# Patient Record
Sex: FEMALE | URBAN - METROPOLITAN AREA
[De-identification: names, ages, dates, MRNs, and addresses within clinical notes are randomized per-mention and may not be internally consistent; named-entity substitution may affect disease eponyms.]

---

## 2019-09-04 ENCOUNTER — APPOINTMENT (RX ONLY)
Dept: URBAN - METROPOLITAN AREA CLINIC 51 | Facility: CLINIC | Age: 74
Setting detail: DERMATOLOGY
End: 2019-09-04

## 2019-09-04 DIAGNOSIS — Z41.9 ENCOUNTER FOR PROCEDURE FOR PURPOSES OTHER THAN REMEDYING HEALTH STATE, UNSPECIFIED: ICD-10-CM

## 2019-09-04 PROCEDURE — ? JUVEDERM ULTRA XC INJECTION

## 2019-09-04 PROCEDURE — ? JUVEDERM VOLLURE XC INJECTION

## 2019-09-04 PROCEDURE — ? BOTOX

## 2019-09-04 PROCEDURE — ? COSMETIC CONSULTATION: BOTULINUM TOXIN

## 2019-09-04 NOTE — PROCEDURE: JUVEDERM VOLLURE XC INJECTION
Anesthesia Type: 1% lidocaine with epinephrine
Additional Area 5 Volume In Cc: 0
Use Map Statement For Sites (Optional): No
Nasolabial Folds Filler Volume In Cc: 2
Filler: Juvederm Vollure XC
Consent: Written consent obtained. Risks include but not limited to bruising, beading, irregular texture, ulceration, infection, allergic reaction, scar formation, incomplete augmentation, temporary nature, procedural pain.
Lot #: [id number]
Additional Anesthesia Volume In Cc: 6
Procedural Text: The filler was administered to the treatment areas noted above.
Post-Care Instructions: Patient instructed to apply ice to reduce swelling.
Anesthesia Volume In Cc: 0.5
Map Statment: See [address] for Complete Details
Detail Level: Detailed
Expiration Date (Month Year): 2019-10-13

## 2019-09-04 NOTE — PROCEDURE: JUVEDERM ULTRA XC INJECTION
Detail Level: Detailed
Use Map Statement For Sites (Optional): No
Map Statment: See [address] for Complete Details
Anesthesia Type: 1% lidocaine with epinephrine
Anesthesia Volume In Cc: 0.5
Additional Anesthesia Volume In Cc: 6
Filler: Juvederm Ultra XC
Jawline Filler Volume In Cc: 0
Cheeks Filler Volume In Cc: 2
Lot #: [id number]
Expiration Date (Month Year): 2020-02-26
Procedural Text: The filler was administered to the treatment areas noted above.
Consent: Written consent obtained. Risks include but not limited to bruising, beading, irregular texture, ulceration, infection, allergic reaction, scar formation, incomplete augmentation, temporary nature, procedural pain.
Post-Care Instructions: Patient instructed to apply ice to reduce swelling.

## 2019-09-04 NOTE — PROCEDURE: BOTOX
Inferior Lateral Orbicularis Oculi Units: 0
Detail Level: Simple
Dilution (U/0.1 Cc): 3.5
Consent: Written consent obtained. Risks include but not limited to lid/brow ptosis, bruising, swelling, diplopia, temporary effect, incomplete chemical denervation.
Post-Care Instructions: Patient instructed to not lie down for 4 hours and limit physical activity for 24 hours. Patient instructed not to travel by airplane for 48 hours.
Glabellar Complex Units: [address]
Periorbital Skin Units: [address] [address]
Lot #: [id number]
Expiration Date (Month Year): [id number]

## 2019-10-03 ENCOUNTER — APPOINTMENT (RX ONLY)
Dept: URBAN - METROPOLITAN AREA CLINIC 51 | Facility: CLINIC | Age: 74
Setting detail: DERMATOLOGY
End: 2019-10-03

## 2019-10-03 DIAGNOSIS — Z41.9 ENCOUNTER FOR PROCEDURE FOR PURPOSES OTHER THAN REMEDYING HEALTH STATE, UNSPECIFIED: ICD-10-CM

## 2019-10-03 PROCEDURE — ? FILLERS

## 2019-10-03 ASSESSMENT — LOCATION SIMPLE DESCRIPTION DERM
LOCATION SIMPLE: LEFT CHEEK
LOCATION SIMPLE: RIGHT CHEEK

## 2019-10-03 ASSESSMENT — LOCATION DETAILED DESCRIPTION DERM
LOCATION DETAILED: RIGHT INFERIOR MEDIAL MALAR CHEEK
LOCATION DETAILED: LEFT INFERIOR MEDIAL MALAR CHEEK
LOCATION DETAILED: RIGHT SUPERIOR LATERAL MALAR CHEEK
LOCATION DETAILED: LEFT SUPERIOR LATERAL MALAR CHEEK

## 2019-10-03 ASSESSMENT — LOCATION ZONE DERM: LOCATION ZONE: FACE

## 2019-10-03 NOTE — PROCEDURE: FILLERS
Versa Syringe Price (Per 1.0 Cc- Numbers Only No Special Characters Or $): 0.00
Consent: Written consent obtained. Risks include but not limited to bruising, beading, irregular texture, ulceration, infection, allergic reaction, scar formation, incomplete augmentation, temporary nature, procedural pain.
Dorsal Hands Filler  Volume In Cc: 0
Additional Anesthesia Volume In Cc: 6
Post-Care Instructions: Patient instructed to apply ice to reduce swelling.
Pricing Information: This plan will add up the cumulative amount of filler you document and will bill based on the unit price noted below. For example if you inject 0.9 ccs of Restylane it will bill for one unit. If you inject 1.3 ccs it will bill for two units.
Filler: Restylane Defyne
Detail Level: Detailed
Nasolabial Folds Filler Volume In Cc: 1
Include Cannula Information In Note?: No
Map Statment: See [address] for Complete Details
Anesthesia Volume In Cc: 0.5
Filler: Restylane-L
Cheeks Filler Volume In Cc: 2

## 2019-10-18 ENCOUNTER — APPOINTMENT (RX ONLY)
Dept: URBAN - METROPOLITAN AREA CLINIC 51 | Facility: CLINIC | Age: 74
Setting detail: DERMATOLOGY
End: 2019-10-18

## 2019-10-18 DIAGNOSIS — Z41.9 ENCOUNTER FOR PROCEDURE FOR PURPOSES OTHER THAN REMEDYING HEALTH STATE, UNSPECIFIED: ICD-10-CM

## 2019-10-18 PROCEDURE — ? FILLERS

## 2019-10-18 PROCEDURE — ? BOTOX

## 2019-10-18 ASSESSMENT — LOCATION DETAILED DESCRIPTION DERM
LOCATION DETAILED: RIGHT CHIN
LOCATION DETAILED: RIGHT MEDIAL FOREHEAD
LOCATION DETAILED: LEFT INFERIOR FOREHEAD
LOCATION DETAILED: RIGHT INFERIOR FOREHEAD
LOCATION DETAILED: LEFT FOREHEAD
LOCATION DETAILED: RIGHT INFERIOR TEMPLE
LOCATION DETAILED: RIGHT SUPERIOR CENTRAL MALAR CHEEK
LOCATION DETAILED: LEFT SUPERIOR FOREHEAD
LOCATION DETAILED: LEFT LOWER CUTANEOUS LIP
LOCATION DETAILED: RIGHT FOREHEAD
LOCATION DETAILED: LEFT MEDIAL BUCCAL CHEEK
LOCATION DETAILED: RIGHT SUPERIOR FOREHEAD
LOCATION DETAILED: RIGHT LOWER CUTANEOUS LIP
LOCATION DETAILED: LEFT INFERIOR MEDIAL FOREHEAD
LOCATION DETAILED: LEFT LATERAL CANTHUS
LOCATION DETAILED: LEFT INFERIOR CENTRAL BUCCAL CHEEK

## 2019-10-18 ASSESSMENT — LOCATION SIMPLE DESCRIPTION DERM
LOCATION SIMPLE: LEFT EYELID
LOCATION SIMPLE: RIGHT TEMPLE
LOCATION SIMPLE: RIGHT CHEEK
LOCATION SIMPLE: CHIN
LOCATION SIMPLE: RIGHT FOREHEAD
LOCATION SIMPLE: LEFT LIP
LOCATION SIMPLE: LEFT FOREHEAD
LOCATION SIMPLE: RIGHT LIP
LOCATION SIMPLE: LEFT CHEEK

## 2019-10-18 ASSESSMENT — LOCATION ZONE DERM
LOCATION ZONE: LIP
LOCATION ZONE: EYELID
LOCATION ZONE: FACE

## 2019-10-18 NOTE — PROCEDURE: FILLERS
Use Map Statement For Sites (Optional): No
Tear Troughs Filler  Volume In Cc: 0
Bellafill Syringe Price (Per 0.8 Cc- Numbers Only No Special Characters Or $): 0.00
Lot #: [id number]
Marionette Lines Filler  Volume In Cc: 1.5
Filler: Radiesse+
Consent: Written consent obtained. Risks include but not limited to bruising, beading, irregular texture, ulceration, infection, allergic reaction, scar formation, incomplete augmentation, temporary nature, procedural pain.
Post-Care Instructions: Patient instructed to apply ice to reduce swelling.
Additional Anesthesia Volume In Cc: 6
Pricing Information: This plan will add up the cumulative amount of filler you document and will bill based on the unit price noted below. For example if you inject 0.9 ccs of Restylane it will bill for one unit. If you inject 1.3 ccs it will bill for two units.
Map Statment: See [address] for Complete Details
Anesthesia Volume In Cc: 0.5
Expiration Date (Month Year): 04/2021
Detail Level: Detailed

## 2019-10-18 NOTE — PROCEDURE: MIPS QUALITY
Quality 226: Preventive Care And Screening: Tobacco Use: Screening And Cessation Intervention: Patient screened for tobacco use and is an ex/non-smoker
Quality 110: Preventive Care And Screening: Influenza Immunization: Influenza immunization was not ordered or administered, reason not given
Quality 431: Preventive Care And Screening: Unhealthy Alcohol Use - Screening: Unhealthy alcohol use screening not performed, reason not otherwise specified
Detail Level: Detailed

## 2019-10-18 NOTE — PROCEDURE: BOTOX
Additional Area 3 Units: 0
Show Masseter Units: Yes
Show Right And Left Pupillary Line Units: No
Dilution (U/0.1 Cc): 3.5
Detail Level: Simple
Forehead Units: 15
Expiration Date (Month Year): [id number]
Consent: Written consent obtained. Risks include but not limited to lid/brow ptosis, bruising, swelling, diplopia, temporary effect, incomplete chemical denervation.
Lot #: [id number]
Periorbital Skin Units: 3
Post-Care Instructions: Patient instructed to not lie down for 4 hours and limit physical activity for 24 hours. Patient instructed not to travel by airplane for 48 hours.

## 2019-12-13 ENCOUNTER — APPOINTMENT (RX ONLY)
Dept: URBAN - METROPOLITAN AREA CLINIC 51 | Facility: CLINIC | Age: 74
Setting detail: DERMATOLOGY
End: 2019-12-13

## 2019-12-13 DIAGNOSIS — Z41.9 ENCOUNTER FOR PROCEDURE FOR PURPOSES OTHER THAN REMEDYING HEALTH STATE, UNSPECIFIED: ICD-10-CM

## 2019-12-13 PROCEDURE — ? XEOMIN

## 2019-12-13 NOTE — PROCEDURE: XEOMIN
Anterior Platysmal Bands Units: 0
Lot #: [id number]
Consent: Written consent obtained. Risks include but not limited to lid/brow ptosis, bruising, swelling, diplopia, temporary effect, incomplete chemical denervation.
Periorbital Skin Units: 24
Dilution (U/0.1 Cc): 4
Expiration Date (Month Year): 2021/08
Glabellar Complex Units: 15
Detail Level: Detailed
Post-Care Instructions: Patient instructed to not lie down for 4 hours and limit physical activity for 24 hours. Patient instructed not to travel by airplane for 48 hours.